# Patient Record
Sex: MALE | Race: WHITE | Employment: UNEMPLOYED | URBAN - METROPOLITAN AREA
[De-identification: names, ages, dates, MRNs, and addresses within clinical notes are randomized per-mention and may not be internally consistent; named-entity substitution may affect disease eponyms.]

---

## 2017-03-16 ENCOUNTER — HOSPITAL ENCOUNTER (OUTPATIENT)
Dept: GENERAL RADIOLOGY | Age: 37
Discharge: HOME | End: 2017-03-16
Payer: SELF-PAY

## 2017-03-16 DIAGNOSIS — M25.569 KNEE PAIN: ICD-10-CM

## 2017-03-16 PROCEDURE — 73562 X-RAY EXAM OF KNEE 3: CPT

## 2017-12-03 ENCOUNTER — HOSPITAL ENCOUNTER (OUTPATIENT)
Age: 37
Setting detail: OBSERVATION
Discharge: HOME | End: 2017-12-04
Attending: STUDENT IN AN ORGANIZED HEALTH CARE EDUCATION/TRAINING PROGRAM | Admitting: INTERNAL MEDICINE
Payer: COMMERCIAL

## 2017-12-03 ENCOUNTER — APPOINTMENT (OUTPATIENT)
Dept: CT IMAGING | Age: 37
End: 2017-12-03
Attending: PHYSICIAN ASSISTANT
Payer: COMMERCIAL

## 2017-12-03 DIAGNOSIS — R10.9 ABDOMINAL PAIN, UNSPECIFIED ABDOMINAL LOCATION: Primary | ICD-10-CM

## 2017-12-03 LAB
ALBUMIN SERPL-MCNC: 4.1 G/DL (ref 3.5–5)
ALBUMIN/GLOB SERPL: 1.1 {RATIO} (ref 1.1–2.2)
ALP SERPL-CCNC: 80 U/L (ref 45–117)
ALT SERPL-CCNC: 54 U/L (ref 12–78)
ANION GAP SERPL CALC-SCNC: 8 MMOL/L (ref 5–15)
APPEARANCE UR: CLEAR
AST SERPL-CCNC: 28 U/L (ref 15–37)
BACTERIA URNS QL MICRO: NEGATIVE /HPF
BASOPHILS # BLD: 0 K/UL (ref 0–0.1)
BASOPHILS NFR BLD: 0 % (ref 0–1)
BILIRUB SERPL-MCNC: 1 MG/DL (ref 0.2–1)
BILIRUB UR QL: NEGATIVE
BUN SERPL-MCNC: 12 MG/DL (ref 6–20)
BUN/CREAT SERPL: 11 (ref 12–20)
CALCIUM SERPL-MCNC: 9.6 MG/DL (ref 8.5–10.1)
CHLORIDE SERPL-SCNC: 105 MMOL/L (ref 97–108)
CO2 SERPL-SCNC: 27 MMOL/L (ref 21–32)
COLOR UR: ABNORMAL
CREAT SERPL-MCNC: 1.09 MG/DL (ref 0.7–1.3)
EOSINOPHIL # BLD: 0.4 K/UL (ref 0–0.4)
EOSINOPHIL NFR BLD: 4 % (ref 0–7)
EPITH CASTS URNS QL MICRO: ABNORMAL /LPF
ERYTHROCYTE [DISTWIDTH] IN BLOOD BY AUTOMATED COUNT: 13.1 % (ref 11.5–14.5)
GLOBULIN SER CALC-MCNC: 3.6 G/DL (ref 2–4)
GLUCOSE SERPL-MCNC: 100 MG/DL (ref 65–100)
GLUCOSE UR STRIP.AUTO-MCNC: NEGATIVE MG/DL
HCT VFR BLD AUTO: 46.5 % (ref 36.6–50.3)
HGB BLD-MCNC: 16.6 G/DL (ref 12.1–17)
HGB UR QL STRIP: NEGATIVE
HYALINE CASTS URNS QL MICRO: ABNORMAL /LPF (ref 0–5)
KETONES UR QL STRIP.AUTO: ABNORMAL MG/DL
LEUKOCYTE ESTERASE UR QL STRIP.AUTO: NEGATIVE
LIPASE SERPL-CCNC: 89 U/L (ref 73–393)
LYMPHOCYTES # BLD: 2.6 K/UL (ref 0.8–3.5)
LYMPHOCYTES NFR BLD: 29 % (ref 12–49)
MCH RBC QN AUTO: 32.2 PG (ref 26–34)
MCHC RBC AUTO-ENTMCNC: 35.7 G/DL (ref 30–36.5)
MCV RBC AUTO: 90.1 FL (ref 80–99)
MONOCYTES # BLD: 0.8 K/UL (ref 0–1)
MONOCYTES NFR BLD: 9 % (ref 5–13)
NEUTS SEG # BLD: 5.2 K/UL (ref 1.8–8)
NEUTS SEG NFR BLD: 58 % (ref 32–75)
NITRITE UR QL STRIP.AUTO: NEGATIVE
PH UR STRIP: 8.5 [PH] (ref 5–8)
PLATELET # BLD AUTO: 218 K/UL (ref 150–400)
POTASSIUM SERPL-SCNC: 4 MMOL/L (ref 3.5–5.1)
PROT SERPL-MCNC: 7.7 G/DL (ref 6.4–8.2)
PROT UR STRIP-MCNC: NEGATIVE MG/DL
RBC # BLD AUTO: 5.16 M/UL (ref 4.1–5.7)
RBC #/AREA URNS HPF: ABNORMAL /HPF (ref 0–5)
SODIUM SERPL-SCNC: 140 MMOL/L (ref 136–145)
SP GR UR REFRACTOMETRY: 1.03 (ref 1–1.03)
TROPONIN I SERPL-MCNC: <0.04 NG/ML
UR CULT HOLD, URHOLD: NORMAL
UROBILINOGEN UR QL STRIP.AUTO: 0.2 EU/DL (ref 0.2–1)
WBC # BLD AUTO: 9 K/UL (ref 4.1–11.1)
WBC URNS QL MICRO: ABNORMAL /HPF (ref 0–4)

## 2017-12-03 PROCEDURE — 74011636320 HC RX REV CODE- 636/320: Performed by: STUDENT IN AN ORGANIZED HEALTH CARE EDUCATION/TRAINING PROGRAM

## 2017-12-03 PROCEDURE — 74011250636 HC RX REV CODE- 250/636: Performed by: PHYSICIAN ASSISTANT

## 2017-12-03 PROCEDURE — 96374 THER/PROPH/DIAG INJ IV PUSH: CPT

## 2017-12-03 PROCEDURE — 81001 URINALYSIS AUTO W/SCOPE: CPT | Performed by: PHYSICIAN ASSISTANT

## 2017-12-03 PROCEDURE — 93005 ELECTROCARDIOGRAM TRACING: CPT

## 2017-12-03 PROCEDURE — 74011000258 HC RX REV CODE- 258: Performed by: STUDENT IN AN ORGANIZED HEALTH CARE EDUCATION/TRAINING PROGRAM

## 2017-12-03 PROCEDURE — 80053 COMPREHEN METABOLIC PANEL: CPT | Performed by: STUDENT IN AN ORGANIZED HEALTH CARE EDUCATION/TRAINING PROGRAM

## 2017-12-03 PROCEDURE — 96361 HYDRATE IV INFUSION ADD-ON: CPT

## 2017-12-03 PROCEDURE — 96375 TX/PRO/DX INJ NEW DRUG ADDON: CPT

## 2017-12-03 PROCEDURE — 74011250637 HC RX REV CODE- 250/637: Performed by: PHYSICIAN ASSISTANT

## 2017-12-03 PROCEDURE — 83690 ASSAY OF LIPASE: CPT | Performed by: STUDENT IN AN ORGANIZED HEALTH CARE EDUCATION/TRAINING PROGRAM

## 2017-12-03 PROCEDURE — 99218 HC RM OBSERVATION: CPT

## 2017-12-03 PROCEDURE — 74011000250 HC RX REV CODE- 250: Performed by: PHYSICIAN ASSISTANT

## 2017-12-03 PROCEDURE — 36415 COLL VENOUS BLD VENIPUNCTURE: CPT | Performed by: STUDENT IN AN ORGANIZED HEALTH CARE EDUCATION/TRAINING PROGRAM

## 2017-12-03 PROCEDURE — 99285 EMERGENCY DEPT VISIT HI MDM: CPT

## 2017-12-03 PROCEDURE — 84484 ASSAY OF TROPONIN QUANT: CPT | Performed by: STUDENT IN AN ORGANIZED HEALTH CARE EDUCATION/TRAINING PROGRAM

## 2017-12-03 PROCEDURE — 74177 CT ABD & PELVIS W/CONTRAST: CPT

## 2017-12-03 PROCEDURE — 85025 COMPLETE CBC W/AUTO DIFF WBC: CPT | Performed by: STUDENT IN AN ORGANIZED HEALTH CARE EDUCATION/TRAINING PROGRAM

## 2017-12-03 RX ORDER — ONDANSETRON 2 MG/ML
4 INJECTION INTRAMUSCULAR; INTRAVENOUS
Status: DISCONTINUED | OUTPATIENT
Start: 2017-12-03 | End: 2017-12-04 | Stop reason: HOSPADM

## 2017-12-03 RX ORDER — KETOROLAC TROMETHAMINE 30 MG/ML
30 INJECTION, SOLUTION INTRAMUSCULAR; INTRAVENOUS
Status: COMPLETED | OUTPATIENT
Start: 2017-12-03 | End: 2017-12-03

## 2017-12-03 RX ORDER — FENTANYL CITRATE 50 UG/ML
50 INJECTION, SOLUTION INTRAMUSCULAR; INTRAVENOUS
Status: COMPLETED | OUTPATIENT
Start: 2017-12-03 | End: 2017-12-03

## 2017-12-03 RX ORDER — ONDANSETRON 2 MG/ML
4 INJECTION INTRAMUSCULAR; INTRAVENOUS
Status: COMPLETED | OUTPATIENT
Start: 2017-12-03 | End: 2017-12-03

## 2017-12-03 RX ORDER — FAMOTIDINE 10 MG/ML
20 INJECTION INTRAVENOUS
Status: DISCONTINUED | OUTPATIENT
Start: 2017-12-03 | End: 2017-12-03 | Stop reason: CLARIF

## 2017-12-03 RX ORDER — MORPHINE SULFATE 10 MG/ML
8 INJECTION, SOLUTION INTRAMUSCULAR; INTRAVENOUS ONCE
Status: COMPLETED | OUTPATIENT
Start: 2017-12-03 | End: 2017-12-03

## 2017-12-03 RX ORDER — SUCRALFATE 1 G/10ML
1 SUSPENSION ORAL
Status: COMPLETED | OUTPATIENT
Start: 2017-12-03 | End: 2017-12-03

## 2017-12-03 RX ORDER — SODIUM CHLORIDE 0.9 % (FLUSH) 0.9 %
5-10 SYRINGE (ML) INJECTION EVERY 8 HOURS
Status: DISCONTINUED | OUTPATIENT
Start: 2017-12-03 | End: 2017-12-04 | Stop reason: HOSPADM

## 2017-12-03 RX ORDER — SODIUM CHLORIDE 0.9 % (FLUSH) 0.9 %
5-10 SYRINGE (ML) INJECTION AS NEEDED
Status: DISCONTINUED | OUTPATIENT
Start: 2017-12-03 | End: 2017-12-04 | Stop reason: HOSPADM

## 2017-12-03 RX ORDER — HYDROMORPHONE HYDROCHLORIDE 2 MG/ML
1 INJECTION, SOLUTION INTRAMUSCULAR; INTRAVENOUS; SUBCUTANEOUS ONCE
Status: DISCONTINUED | OUTPATIENT
Start: 2017-12-03 | End: 2017-12-03

## 2017-12-03 RX ORDER — SODIUM CHLORIDE 0.9 % (FLUSH) 0.9 %
10 SYRINGE (ML) INJECTION
Status: COMPLETED | OUTPATIENT
Start: 2017-12-03 | End: 2017-12-03

## 2017-12-03 RX ORDER — DICYCLOMINE HYDROCHLORIDE 20 MG/1
20 TABLET ORAL
Status: COMPLETED | OUTPATIENT
Start: 2017-12-03 | End: 2017-12-03

## 2017-12-03 RX ORDER — HYDROMORPHONE HYDROCHLORIDE 2 MG/ML
1 INJECTION, SOLUTION INTRAMUSCULAR; INTRAVENOUS; SUBCUTANEOUS ONCE
Status: COMPLETED | OUTPATIENT
Start: 2017-12-03 | End: 2017-12-03

## 2017-12-03 RX ADMIN — MORPHINE SULFATE 8 MG: 10 INJECTION, SOLUTION INTRAMUSCULAR; INTRAVENOUS at 21:19

## 2017-12-03 RX ADMIN — HYDROMORPHONE HYDROCHLORIDE 1 MG: 2 INJECTION INTRAMUSCULAR; INTRAVENOUS; SUBCUTANEOUS at 19:57

## 2017-12-03 RX ADMIN — SUCRALFATE 1 G: 1 SUSPENSION ORAL at 22:34

## 2017-12-03 RX ADMIN — SODIUM CHLORIDE 1000 ML: 900 INJECTION, SOLUTION INTRAVENOUS at 19:57

## 2017-12-03 RX ADMIN — DICYCLOMINE HYDROCHLORIDE 20 MG: 20 TABLET ORAL at 20:02

## 2017-12-03 RX ADMIN — KETOROLAC TROMETHAMINE 30 MG: 30 INJECTION, SOLUTION INTRAMUSCULAR at 21:19

## 2017-12-03 RX ADMIN — IOPAMIDOL 100 ML: 755 INJECTION, SOLUTION INTRAVENOUS at 20:45

## 2017-12-03 RX ADMIN — FAMOTIDINE 20 MG: 10 INJECTION, SOLUTION INTRAVENOUS at 22:36

## 2017-12-03 RX ADMIN — ONDANSETRON 4 MG: 2 INJECTION INTRAMUSCULAR; INTRAVENOUS at 19:57

## 2017-12-03 RX ADMIN — Medication 10 ML: at 20:45

## 2017-12-03 RX ADMIN — SODIUM CHLORIDE 100 ML: 900 INJECTION, SOLUTION INTRAVENOUS at 20:45

## 2017-12-03 RX ADMIN — FENTANYL CITRATE 50 MCG: 50 INJECTION, SOLUTION INTRAMUSCULAR; INTRAVENOUS at 20:28

## 2017-12-03 NOTE — ED TRIAGE NOTES
Pt states that he has severe pain to his lower abd which started 20 minutes ago with no N/V/D. Pt states that he has had this severe pain on three other occasions.

## 2017-12-03 NOTE — IP AVS SNAPSHOT
[address] 
[phone] Patient: Carlos Rutherford MRN: [id number] QTN:9/08/0870 You are allergic to the following Allergen Reactions Chicken Derived Unknown (comments) Egg Nausea Only Pineapple Unknown (comments) Recent Documentation Height Weight BMI Smoking Status 1.854 m 121.6 kg 35.36 kg/m2 Never Smoker Emergency Contacts  (Rel.) Home Phone Work Phone Mobile Phone None,None -- -- -- About your hospitalization You were admitted on:  December 3, 2017 You last received care in the:  [facility] You were discharged on:  December 4, 2017 Why you were hospitalized Your primary diagnosis was:  Not on File Your diagnoses also included:  Abdominal Pain Providers Seen During Your Hospitalization Provider Specialty Primary office phone Onesimo Miller MD Emergency Medicine [phone] Juan A Wetzel MD Internal Medicine [phone] Aguila Horton MD Internal Medicine [phone] Your Primary Care Physician (PCP) Primary Care Physician Office Phone Office Fax [facility] [phone] Follow-up Information Follow up With Details Comments Contact Info Piyush العلي MD In 1 week  [facility] [address] [place] 
[phone] Hollis Edmonds MD In 2 weeks  [address] [facility] [address] [place] 
[phone] My Medications TAKE these medications as instructed Instructions Each Dose to Equal  
 Morning Noon Evening Bedtime * amphetamine-dextroamphetamine XR 25 mg XR capsule Commonly known as:  ADDERALL XR Your last dose was: Your next dose is: Take 1 Cap by mouth every morning.  Max Daily Amount: 25 mg.  
 25 mg  
    
   
   
   
  
 * amphetamine-dextroamphetamine XR 20 mg XR capsule Commonly known as:  ADDERALL XR Your last dose was: Your next dose is: Take 1 Cap by mouth every morning. Max Daily Amount: 20 mg.  
 20 mg  
    
   
   
   
  
 ergocalciferol 50,000 unit capsule Commonly known as:  ERGOCALCIFEROL Your last dose was: Your next dose is:    
   
   
      
   
   
   
  
 HYDROcodone-acetaminophen 5-325 mg per tablet Commonly known as:  Chantal Fothergill Your last dose was: Your next dose is: Take 1 Tab by mouth every six (6) hours as needed. Max Daily Amount: 4 Tabs. 1 Tab  
    
   
   
   
  
 pantoprazole 40 mg tablet Commonly known as:  PROTONIX Start taking on:  12/5/2017 Your last dose was: Your next dose is: Take 1 Tab by mouth Daily (before breakfast). 40 mg  
    
   
   
   
  
 vortioxetine 10 mg tablet Commonly known as:  [employer] Your last dose was: Your next dose is: Take 1 Tab by mouth daily. 10 mg  
    
   
   
   
  
 * Notice: This list has 2 medication(s) that are the same as other medications prescribed for you. Read the directions carefully, and ask your doctor or other care provider to review them with you. Where to Get Your Medications Information on where to get these meds will be given to you by the nurse or doctor. ! Ask your nurse or doctor about these medications HYDROcodone-acetaminophen 5-325 mg per tablet  
 pantoprazole 40 mg tablet Discharge Instructions Discharge Instructions PATIENT ID: Librado Mata MRN: [id number] YOB: 1980 DATE OF ADMISSION: 12/3/2017  7:18 PM   
DATE OF DISCHARGE: 12/4/2017 PRIMARY CARE PROVIDER: Lina Greenwood MD  
 
ATTENDING PHYSICIAN: Johnathan Olivo MD 
DISCHARGING PROVIDER: Johnathan Olivo MD   
 To contact this individual call [phone] and ask the  to page. If unavailable ask to be transferred the Adult Hospitalist Department. DISCHARGE DIAGNOSES Abdominal pain CONSULTATIONS: IP CONSULT TO GENERAL SURGERY 
IP CONSULT TO HOSPITALIST 
IP CONSULT TO GASTROENTEROLOGY PROCEDURES/SURGERIES: Procedure(s): ESOPHAGOGASTRODUODENOSCOPY (EGD) PENDING TEST RESULTS:  
At the time of discharge the following test results are still pending: none FOLLOW UP APPOINTMENTS:  
Follow-up Information Follow up With Details Comments Contact Linette العلي MD In 1 week  [place] [address] 
[phone] ADDITIONAL CARE RECOMMENDATIONS:  
Follow up with Gi in 1 week DIET: Cardiac Diet ACTIVITY: Activity as tolerated DISCHARGE MEDICATIONS: 
 See Medication Reconciliation Form · It is important that you take the medication exactly as they are prescribed. · Keep your medication in the bottles provided by the pharmacist and keep a list of the medication names, dosages, and times to be taken in your wallet. · Do not take other medications without consulting your doctor. NOTIFY YOUR PHYSICIAN FOR ANY OF THE FOLLOWING:  
Fever over 101 degrees for 24 hours. Chest pain, shortness of breath, fever, chills, nausea, vomiting, diarrhea, change in mentation, falling, weakness, bleeding. Severe pain or pain not relieved by medications. Or, any other signs or symptoms that you may have questions about. DISPOSITION: 
x  Home With: 
 OT  PT  [facility]  RN  
  
 SNF/Inpatient Rehab/LTAC Independent/assisted living Hospice Other: CDMP Checked:  
Yes x PROBLEM LIST Updated: 
Yes x Signed:  
Aguila Horton MD 
12/4/2017 
5:08 PM 
 
[employer] Activation Thank you for requesting access to [employer]. Please follow the instructions below to securely access and download your online medical record.  [employer] allows you to send messages to your doctor, view your test results, renew your prescriptions, schedule appointments, and more. How Do I Sign Up? 1. In your internet browser, go to [email or web address] 
2. Click on the First Time User? Click Here link in the Sign In box. You will be redirect to the New Member Sign Up page. 3. Enter your Theracos Access Code exactly as it appears below. You will not need to use this code after youve completed the sign-up process. If you do not sign up before the expiration date, you must request a new code. Ther[employer]os Access Code: [email or web address] Expires: 3/4/2018 10:12 AM (This is the date your Ther[employer]os access code will ) 4. Enter the last four digits of your Social Security Number (xxxx) and Date of Birth (mm/dd/yyyy) as indicated and click Submit. You will be taken to the next sign-up page. 5. Create a Theracos ID. This will be your Theracos login ID and cannot be changed, so think of one that is secure and easy to remember. 6. Create a Theracos password. You can change your password at any time. 7. Enter your Password Reset Question and Answer. This can be used at a later time if you forget your password. 8. Enter your e-mail address. You will receive e-mail notification when new information is available in [address] 9. Click Sign Up. You can now view and download portions of your medical record. 10. Click the Download Summary menu link to download a portable copy of your medical information. Additional Information If you have questions, please visit the Frequently Asked Questions section of the [employer] website at [email or web address] Remember, MyChart is NOT to be used for urgent needs. For medical emergencies, dial 911. Pantoprazole (Protonix) - (By mouth) Why this medicine is used:  
Treats gastroesophageal reflux disease (GERD), a damaged esophagus, and high levels of stomach acid. Contact a nurse or doctor right away if you have: · Blistering, peeling, red skin rash · Swelling, muscle pain, stiffness, cramps, or twitching · Joint pain, rash on your cheeks or arms that gets worse in the sun · Dark-colored urine, change in how much or how often you urinate · Seizures, dizziness, uneven heartbeat · Severe diarrhea, stomach cramps, fever, weight gain Common side effects: · Mild diarrhea, stomach pain · Headache, tiredness © 2017 [facility] Information is for End User's use only and may not be sold, redistributed or otherwise used for commercial purposes. Hydrocodone/Acetaminophen (Vicodin, Norco, Lortab) - (By mouth) Why this medicine is used:  
Treats pain. Contact a nurse or doctor right away if you have: · Blistering, peeling, red skin rash · Fast or slow heartbeat, shallow breathing, blue lips, fingernails, or skin · Anxiety, restlessness, muscle spasms, twitching, seeing or hearing things that are not there · Dark urine or pale stools, yellow skin or eyes · Extreme weakness, sweating, seizures, cold or clammy skin · Lightheadedness, dizziness, fainting, fever, sweating Common side effects: 
· Constipation, nausea, vomiting, loss of appetite, stomach pain · Tiredness or sleepiness © 2017 [facility] Information is for End User's use only and may not be sold, redistributed or otherwise used for commercial purposes. Discharge Orders None [place] Announcement We are excited to announce that we are making your provider's discharge notes available to you in Zipwhip. You will see these notes when they are completed and signed by the physician that discharged you from your recent hospital stay.   If you have any questions or concerns about any information you see in Zipwhip, please call the Health Information Department where you were seen or reach out to your Primary Care Provider for more information about your plan of care. Introducing [facility]! Jerel Jean introduces [employer]use patient portal. Now you can access parts of your medical record, email your doctor's office, and request medication refills online. 1. In your internet browser, go to [email or web address] 2. Click on the First Time User? Click Here link in the Sign In box. You will see the New Member Sign Up page. 3. Enter your [employer]Muse Access Code exactly as it appears below. You will not need to use this code after youve completed the sign-up process. If you do not sign up before the expiration date, you must request a new code. · [employer] Access Code: [email or web address] Expires: 3/4/2018 10:12 AM 
 
4. Enter the last four digits of your Social Security Number (xxxx) and Date of Birth (mm/dd/yyyy) as indicated and click Submit. You will be taken to the next sign-up page. 5. Create a MarketMuse ID. This will be your MarketMuse login ID and cannot be changed, so think of one that is secure and easy to remember. 6. Create a [employer]Muse password. You can change your password at any time. 7. Enter your Password Reset Question and Answer. This can be used at a later time if you forget your password. 8. Enter your e-mail address. You will receive e-mail notification when new information is available in [address] 9. Click Sign Up. You can now view and download portions of your medical record. 10. Click the Download Summary menu link to download a portable copy of your medical information. If you have questions, please visit the Frequently Asked Questions section of the [employer]Muse website. Remember, [employer]use is NOT to be used for urgent needs. For medical emergencies, dial 911. Now available from your iPhone and Android! General Information Please provide this summary of care documentation to your next provider.  
  
  
    
    
 Patient Signature: ____________________________________________________________ Date:  ____________________________________________________________  
  
[employer] Provider Signature:  ____________________________________________________________ Date:  ____________________________________________________________

## 2017-12-04 ENCOUNTER — APPOINTMENT (OUTPATIENT)
Dept: NUCLEAR MEDICINE | Age: 37
End: 2017-12-04
Attending: SURGERY
Payer: COMMERCIAL

## 2017-12-04 VITALS
WEIGHT: 268 LBS | HEART RATE: 74 BPM | RESPIRATION RATE: 16 BRPM | SYSTOLIC BLOOD PRESSURE: 121 MMHG | TEMPERATURE: 98 F | DIASTOLIC BLOOD PRESSURE: 82 MMHG | HEIGHT: 73 IN | BODY MASS INDEX: 35.52 KG/M2 | OXYGEN SATURATION: 96 %

## 2017-12-04 LAB
ATRIAL RATE: 72 BPM
CALCULATED P AXIS, ECG09: 48 DEGREES
CALCULATED R AXIS, ECG10: 22 DEGREES
CALCULATED T AXIS, ECG11: 39 DEGREES
DIAGNOSIS, 93000: NORMAL
P-R INTERVAL, ECG05: 168 MS
Q-T INTERVAL, ECG07: 382 MS
QRS DURATION, ECG06: 100 MS
QTC CALCULATION (BEZET), ECG08: 418 MS
VENTRICULAR RATE, ECG03: 72 BPM

## 2017-12-04 PROCEDURE — 99218 HC RM OBSERVATION: CPT

## 2017-12-04 PROCEDURE — A9537 TC99M MEBROFENIN: HCPCS

## 2017-12-04 PROCEDURE — 74011250636 HC RX REV CODE- 250/636: Performed by: INTERNAL MEDICINE

## 2017-12-04 PROCEDURE — 74011250637 HC RX REV CODE- 250/637: Performed by: HOSPITALIST

## 2017-12-04 PROCEDURE — 96376 TX/PRO/DX INJ SAME DRUG ADON: CPT

## 2017-12-04 PROCEDURE — 74011250637 HC RX REV CODE- 250/637: Performed by: INTERNAL MEDICINE

## 2017-12-04 PROCEDURE — 96372 THER/PROPH/DIAG INJ SC/IM: CPT

## 2017-12-04 RX ORDER — MORPHINE SULFATE 2 MG/ML
2 INJECTION, SOLUTION INTRAMUSCULAR; INTRAVENOUS
Status: DISCONTINUED | OUTPATIENT
Start: 2017-12-04 | End: 2017-12-04 | Stop reason: HOSPADM

## 2017-12-04 RX ORDER — PANTOPRAZOLE SODIUM 40 MG/1
40 TABLET, DELAYED RELEASE ORAL
Qty: 30 TAB | Refills: 0 | Status: SHIPPED | OUTPATIENT
Start: 2017-12-05

## 2017-12-04 RX ORDER — MORPHINE SULFATE 2 MG/ML
2 INJECTION, SOLUTION INTRAMUSCULAR; INTRAVENOUS
Status: COMPLETED | OUTPATIENT
Start: 2017-12-04 | End: 2017-12-04

## 2017-12-04 RX ORDER — DEXTROAMPHETAMINE SACCHARATE, AMPHETAMINE ASPARTATE MONOHYDRATE, DEXTROAMPHETAMINE SULFATE AND AMPHETAMINE SULFATE 1.25; 1.25; 1.25; 1.25 MG/1; MG/1; MG/1; MG/1
20 CAPSULE, EXTENDED RELEASE ORAL
Status: DISCONTINUED | OUTPATIENT
Start: 2017-12-04 | End: 2017-12-04 | Stop reason: ALTCHOICE

## 2017-12-04 RX ORDER — HYDROCODONE BITARTRATE AND ACETAMINOPHEN 5; 325 MG/1; MG/1
1 TABLET ORAL
Qty: 10 TAB | Refills: 0 | Status: SHIPPED | OUTPATIENT
Start: 2017-12-04

## 2017-12-04 RX ORDER — HYDROCODONE BITARTRATE AND ACETAMINOPHEN 5; 325 MG/1; MG/1
1 TABLET ORAL
Status: DISCONTINUED | OUTPATIENT
Start: 2017-12-04 | End: 2017-12-04 | Stop reason: HOSPADM

## 2017-12-04 RX ORDER — PANTOPRAZOLE SODIUM 40 MG/1
40 TABLET, DELAYED RELEASE ORAL
Status: DISCONTINUED | OUTPATIENT
Start: 2017-12-04 | End: 2017-12-04 | Stop reason: HOSPADM

## 2017-12-04 RX ORDER — HEPARIN SODIUM 5000 [USP'U]/ML
5000 INJECTION, SOLUTION INTRAVENOUS; SUBCUTANEOUS EVERY 8 HOURS
Status: DISCONTINUED | OUTPATIENT
Start: 2017-12-04 | End: 2017-12-04

## 2017-12-04 RX ORDER — DEXTROAMPHETAMINE SACCHARATE, AMPHETAMINE ASPARTATE MONOHYDRATE, DEXTROAMPHETAMINE SULFATE AND AMPHETAMINE SULFATE 1.25; 1.25; 1.25; 1.25 MG/1; MG/1; MG/1; MG/1
25 CAPSULE, EXTENDED RELEASE ORAL
Status: DISCONTINUED | OUTPATIENT
Start: 2017-12-04 | End: 2017-12-04 | Stop reason: ALTCHOICE

## 2017-12-04 RX ORDER — ACETAMINOPHEN 325 MG/1
650 TABLET ORAL
Status: DISCONTINUED | OUTPATIENT
Start: 2017-12-04 | End: 2017-12-04 | Stop reason: HOSPADM

## 2017-12-04 RX ADMIN — PANTOPRAZOLE SODIUM 40 MG: 40 TABLET, DELAYED RELEASE ORAL at 10:28

## 2017-12-04 RX ADMIN — HYDROCODONE BITARTRATE AND ACETAMINOPHEN 1 TABLET: 5; 325 TABLET ORAL at 00:49

## 2017-12-04 RX ADMIN — Medication 10 ML: at 05:41

## 2017-12-04 RX ADMIN — HEPARIN SODIUM 5000 UNITS: 5000 INJECTION, SOLUTION INTRAVENOUS; SUBCUTANEOUS at 00:50

## 2017-12-04 RX ADMIN — MORPHINE SULFATE 2 MG: 2 INJECTION, SOLUTION INTRAMUSCULAR; INTRAVENOUS at 15:36

## 2017-12-04 RX ADMIN — ACETAMINOPHEN 650 MG: 325 TABLET, FILM COATED ORAL at 12:19

## 2017-12-04 RX ADMIN — Medication 10 ML: at 00:53

## 2017-12-04 NOTE — PROGRESS NOTES
TRANSFER - IN REPORT:    Verbal report received from [facility] RN(name) on Masha Mojica  being received from ED(unit) for {TRANSFER CARE:11566}      Report consisted of patients Situation, Background, Assessment and   Recommendations(SBAR). Information from the following report(s) {SBAR REPORTS HRWW:16732} was reviewed with the receiving nurse. Opportunity for questions and clarification was provided. Assessment completed upon patients arrival to unit and care assumed.

## 2017-12-04 NOTE — PROGRESS NOTES
Spoke with tami carlton and informed patient has returned from scan and she will see him this afternoon. Informed Quynh Krishnamurthy primary nurse.

## 2017-12-04 NOTE — H&P
[address] [facility] [place] [address]   HISTORY AND PHYSICAL       Name:  Mayte Palomo   MR#:  [id number]   :  1980   Account #:  [de-identified]        Date of Adm:  2017       HISTORY OF PRESENT ILLNESS: This 80-year-old white male   presents to the emergency department with epigastric/periumbilical   abdominal pain that occurred this afternoon. He states the pain feels   as if somebody is twisting his stomach. He denies any nausea,   vomiting, diarrhea, constipation, fever or chills. He does report   dysphagia, in which he states solid foods, such as rice, potatoes,   meats and bread get stuck in his throat/chest, and has occurred for   approximately 15 years, in which he has been told he has had a dilated   esophagus. He denies any liquids causing discomfort. He denies any change in weight, chest pain, shortness of breath,   nausea, vomiting, change in frequency, urgency of urination,   hematemesis, melena, hematochezia, tingling, or numbness. PAST MEDICAL HISTORY:   1. Depression. 2. Migraines. PAST SURGICAL HISTORY: Right knee surgery. MEDICATIONS:   1. Adderall 45 mg p.o. every morning. 2. Ergocalciferol 50,000 international units. 3. Brintellix 10 mg p.o. every day. ALLERGIES: NO KNOWN DRUG ALLERGIES. FAMILY HISTORY: The patient reports his grandmother had   dysphagia similar to what he has had. SOCIAL HISTORY: The patient lives alone. 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 He denies any alcohol, tobacco or illicit drug   usage. REVIEW OF SYSTEMS: Sixteen systems reviewed. Significant   as stated in HPI. PHYSICAL EXAMINATION   GENERAL: White male lying in bed in no acute cardiopulmonary   distress. VITAL SIGNS: Temperature 97.8 degrees Fahrenheit, pulse 90,   respirations 18, blood pressure 113/88, O2 saturations 97% on 4 liters   nasal cannula. HEAD: Head normocephalic, atraumatic.  No maxillary, frontal, or temporal tenderness. EYES: Pupils equal, round, reactive to light. Anicteric sclerae. Conjunctivae pink, moist.   NOSE: No deviated or perforated septum. No nasal discharge. MOUTH/THROAT: Oral mucosa pink, moist. Non erythematous throat . No ulcers, no lesions. CARDIOVASCULAR: Regular rate and rhythm, S1, S2 audible. No   murmurs, gallops, rubs. No edema, no JVD. No carotid abdominal   bruits. Radial and dorsalis pedis pulses 2+ bilaterally. LUNGS: Clear to auscultation bilaterally. No wheezes, rales or rhonchi. GASTROINTESTINAL: Bowel sounds present. Abdomen soft,   nontender, nondistended. No guarding, rebound or   hepatosplenomegaly. MUSCULOSKELETAL: Gross range of motion of the upper and lower   extremities bilaterally. No gross muscle atrophy is noted. NEUROLOGIC: Alert and oriented to person, place and time. Cranial   nerves 2-12 grossly intact. Biceps and patellar deep tendon reflexes 2+   bilaterally. Muscle strength 5/5 in all extremities. SKIN: No rashes or pallor. Multiple tattoos. ENDOCRINE: No thyromegaly. LYMPHATICS: No cervical, axillary, or inguinal lymphadenopathy. LABORATORY DATA:   CBC with differential: WBC 9, hemoglobin 16.6,   hematocrit 46.5, platelets 706, neutrophils 58%, lymphocytes 29%,   monocytes 9%, eosinophils 1%, basophils 0%. Urinalysis: Color   is yellow/straw, appearance is clear, specific gravity 1.026, pH 8.5,   protein negative, glucose negative, ketones trace, blood negative,   bilirubin negative, urobilinogen 0.2, nitrites negative, leukocyte   esterase negative, epithelial cells few, WBC's per high-power field 0-4,   RBC's per high-power field 0-5, bacteria negative, and hyaline casts 0-  2. CMP: Sodium 140, potassium 4, chloride 105, bicarbonate 27, anion   gap 8, BUN 12, creatinine 1.09, glucose 100. Calcium 9.6. Total   bilirubin 1, total protein 7.7, albumin 4.1, ALT 54, AST 28, alkaline   phosphatase 80, lipase 89.  Troponin less than 0.04.    ELECTROCARDIOGRAM: EKG with normal sinus rhythm. ABDOMINAL AND PELVIC CT SCAN: CT of the abdomen and pelvis   with contrast, per Radiology, shows cholelithiasis and left   nephrolithiasis with no acute findings. ASSESSMENT AND PLAN: This 59-year-old white male presents with   iatrogenic acute respiratory failure with hypoxia and   epigastric/periumbilical abdominal pain. PROBLEM LIST:   1. Iatrogenic acute respiratory failure with hypoxia. 2. Epigastric/periumbilical abdominal pain. 3. Cholelithiasis. 4. Nephrolithiasis. 5. Depression. 6. Migraine headaches. PLAN: The patient will be placed on observation. He was seen and   evaluated by Surgery and surgeon does not feel the pain to be biliary   in nature with no acute indication for cholecystectomy. Their   recommendation is to do a trial of PPI and Carafate for possible peptic   ulcer disease and if pain persists, recommend evaluation by GI for a   possible EGD or to consider a HIDA scan if pain persists. At time of encounter by this physician, the patient did not have any   pain or tenderness. Will place on a clear liquid diet and advance as   tolerated. A GI consult has been placed and will await their   recommendations. As relates to his acute hypoxic respiratory failure, secondary to   receiving pain/opioid medications, in which his O2 saturations dropped   to 85%. We will continue O2 nasal cannula and will titrate to room air. We will continue his Adderall and Brintellix for his depression. He does   not have history of ADHD; however, because of his Adderall usage,   most likely this is what he has. Will place SCD hose for DVT prophylaxis. We will control pain with acetaminophen, Norco and morphine   respectively. CODE STATUS: FULL CODE. TOTAL TIME FOR ADMISSION: 75 minutes.         Maximo Doe MD OB / Robert Zepeda   D:  12/03/2017   23:47   T:  12/04/2017   03:47   Job #:  [id number]

## 2017-12-04 NOTE — PROGRESS NOTES
Mr. Kadi Chung is feeling better today. Tm 98.4 HR: 74 BP: 121/82 Resp Rate: 16 per minute 96% sat on room air. No intake or output data in the 24 hours ending 12/04/17 1332   Exam: Cor: RRR. Lungs: Bilateral breath sounds. Clear to auscultation. Abd: Soft. Non distended. Tender in RUQ. No associated rebound or guarding. Labs: No results found for this or any previous visit (from the past 12 hour(s)). GI input - noted. Mr. Kadi Chung does not wish to proceed with EGD during this admission. HIDA scan is pending. Continue Protonix for now. Plans per Dr. Chela Velasquez.

## 2017-12-04 NOTE — PROGRESS NOTES
Hospitalist Progress Note  Mayur Perez MD  Answering service: [phone] OR [phone] from in house phone  Cell: [phone]      Date of Service:  2017  NAME:  Earney Bosworth  :  1980  MRN:  [id number]      Admission Summary:   45-year-old white male   presents to the emergency department with epigastric/periumbilical   abdominal pain that occurred this afternoon. He states the pain feels   as if somebody is twisting his stomach. He denies any nausea,   vomiting, diarrhea, constipation, fever or chills. He does report   dysphagia, in which he states solid foods, such as rice, potatoes,   meats and bread get stuck in his throat/chest, and has occurred for   approximately 15 years, in which he has been told he has had a dilated   esophagus. He denies any liquids causing discomfort    Interval history / Subjective:     F/u abd pain   The patient now feels better and wants to get discharged  Assessment & Plan:     Abd pain  -sec to ? PUD  -On PPI/Carafate  -HIDA scan and consult from GI ordered, follow  -Plan for EGD but the patient does not want to do it. Will discharge the patient with outpatient follow up. Spoke to GI, cleared    SOB/hypoxia  -sec to pain meds  -now resolved    Clears    Code status: FULL CODE  DVT prophylaxis: scd    Plan: Discharge when cleared by GI    Care Plan discussed with: Patient/Family  Disposition: TBD     Hospital Problems  Date Reviewed: 7/15/2015          Codes Class Noted POA    Abdominal pain ICD-10-CM: R10.9  ICD-9-CM: 789.00  12/3/2017 Unknown                Review of Systems:   A comprehensive review of systems was negative except for that written in the HPI. Vital Signs:    Last 24hrs VS reviewed since prior progress note.  Most recent are:  Visit Vitals    /89 (BP 1 Location: Left arm, BP Patient Position: At rest)    Pulse 86    Temp 97.6 °F (36.4 °C)    Resp 18    Ht 6' 1\" (1.854 m)    Wt 121.6 kg (268 lb)    SpO2 100%    BMI 35.36 kg/m2       No intake or output data in the 24 hours ending 12/04/17 0912     Physical Examination:             Constitutional:  No acute distress, cooperative, pleasant    ENT:  Oral mucous moist, oropharynx benign. Neck supple,    Resp:  CTA bilaterally. No wheezing/rhonchi/rales. No accessory muscle use   CV:  Regular rhythm, normal rate, no murmurs, gallops, rubs    GI:  Soft, non distended, non tender. normoactive bowel sounds, no hepatosplenomegaly     Musculoskeletal:  No edema, warm, 2+ pulses throughout    Neurologic:  Moves all extremities. AAOx3, CN II-XII reviewed     Skin:  Good turgor, no rashes or ulcers       Data Review:    Review and/or order of clinical lab test      Labs:     Recent Labs      12/03/17 1903   WBC  9.0   HGB  16.6   HCT  46.5   PLT  218     Recent Labs      12/03/17 1903   NA  140   K  4.0   CL  105   CO2  27   BUN  12   CREA  1.09   GLU  100   CA  9.6     Recent Labs      12/03/17 1903   SGOT  28   ALT  54   AP  80   TBILI  1.0   TP  7.7   ALB  4.1   GLOB  3.6   LPSE  89     No results for input(s): INR, PTP, APTT in the last 72 hours. No lab exists for component: INREXT   No results for input(s): FE, TIBC, PSAT, FERR in the last 72 hours. No results found for: FOL, RBCF   No results for input(s): PH, PCO2, PO2 in the last 72 hours.   Recent Labs      12/03/17 1903   TROIQ  <0.04     No results found for: CHOL, CHOLX, CHLST, CHOLV, HDL, LDL, LDLC, DLDLP, TGLX, TRIGL, TRIGP, CHHD, CHHDX  No results found for: [facility]  Lab Results   Component Value Date/Time    Color YELLOW/STRAW 12/03/2017 09:22 PM    Appearance CLEAR 12/03/2017 09:22 PM    Specific gravity 1.026 12/03/2017 09:22 PM    pH (UA) 8.5 12/03/2017 09:22 PM    Protein NEGATIVE  12/03/2017 09:22 PM    Glucose NEGATIVE  12/03/2017 09:22 PM    Ketone TRACE 12/03/2017 09:22 PM    Bilirubin NEGATIVE  12/03/2017 09:22 PM    Urobilinogen 0.2 12/03/2017 09:22 PM Nitrites NEGATIVE  12/03/2017 09:22 PM    Leukocyte Esterase NEGATIVE  12/03/2017 09:22 PM    Epithelial cells FEW 12/03/2017 09:22 PM    Bacteria NEGATIVE  12/03/2017 09:22 PM    WBC 0-4 12/03/2017 09:22 PM    RBC 0-5 12/03/2017 09:22 PM         Medications Reviewed:     Current Facility-Administered Medications   Medication Dose Route Frequency    acetaminophen (TYLENOL) tablet 650 mg  650 mg Oral Q6H PRN    HYDROcodone-acetaminophen (NORCO) 5-325 mg per tablet 1 Tab  1 Tab Oral Q6H PRN    morphine injection 2 mg  2 mg IntraVENous Q4H PRN    heparin (porcine) injection 5,000 Units  5,000 Units SubCUTAneous Q8H    sodium chloride (NS) flush 5-10 mL  5-10 mL IntraVENous Q8H    sodium chloride (NS) flush 5-10 mL  5-10 mL IntraVENous PRN    ondansetron (ZOFRAN) injection 4 mg  4 mg IntraVENous Q4H PRN     ______________________________________________________________________  EXPECTED LENGTH OF STAY: - - -  ACTUAL LENGTH OF STAY:          Concetta Inman MD

## 2017-12-04 NOTE — DISCHARGE SUMMARY
Discharge Summary       PATIENT ID: Tristan Rodriguez  MRN: [id number]   YOB: 1980    DATE OF ADMISSION: 12/3/2017  7:18 PM    DATE OF DISCHARGE: 12/4/2017   PRIMARY CARE PROVIDER: Solitario Yee MD     ATTENDING PHYSICIAN: Dr John Tirado  DISCHARGING PROVIDER: John Tirado MD    To contact this individual call [phone] and ask the  to page. If unavailable ask to be transferred the Adult Hospitalist Department. CONSULTATIONS: IP CONSULT TO GENERAL SURGERY  IP CONSULT TO HOSPITALIST  IP CONSULT TO GASTROENTEROLOGY    PROCEDURES/SURGERIES: Procedure(s):  ESOPHAGOGASTRODUODENOSCOPY (EGD)    ADMITTING DIAGNOSES & HOSPITAL COURSE:   Abd pain  -sec to ? PUD  -On PPI/Carafate  -HIDA scan shows chronic cholecystitis  -Plan for EGD but the patient does not want to do it. The patient is very angry because \"I have not eaten anything since this morning\". Cleared from GI perspective if cleared by Surgery  SPoke to Dr Sarahi Henderson, on call for Dr Sharda Betancourt, recommended that if the patient is not having severe pain he can leave with outpatient follow up    SOB/hypoxia  -sec to pain meds  -now resolved    Clears    Code status: FULL CODE  DVT prophylaxis: scd        DISCHARGE DIAGNOSES / PLAN:      1.  abd pain       PENDING TEST RESULTS:   At the time of discharge the following test results are still pending: none    FOLLOW UP APPOINTMENTS:    Follow-up Information     Follow up With Details Comments [address] [place] In 1 week  [address]  [phone]             ADDITIONAL CARE RECOMMENDATIONS:   Follow up with GI in 1 week    DIET: Cardiac Diet    ACTIVITY: Activity as tolerated      DISCHARGE MEDICATIONS:  Current Discharge Medication List      START taking these medications    Details   pantoprazole (PROTONIX) 40 mg tablet Take 1 Tab by mouth Daily (before breakfast).   Qty: 30 Tab, Refills: 0         CONTINUE these medications which have NOT CHANGED    Details   amphetamine-dextroamphetamine XR (ADDERALL XR) 20 mg XR capsule Take 1 Cap by mouth every morning. Max Daily Amount: 20 mg.  Qty: 30 Cap, Refills: 0    Associated Diagnoses: Anxiety state      amphetamine-dextroamphetamine XR (ADDERALL XR) 25 mg XR capsule Take 1 Cap by mouth every morning. Max Daily Amount: 25 mg.  Qty: 30 Cap, Refills: 0    Associated Diagnoses: Anxiety state      vortioxetine (BRINTELLIX) 10 mg tablet Take 1 Tab by mouth daily. Qty: 30 Tab, Refills: 0    Associated Diagnoses: Anxiety state      ergocalciferol (ERGOCALCIFEROL) 50,000 unit capsule                NOTIFY YOUR PHYSICIAN FOR ANY OF THE FOLLOWING:   Fever over 101 degrees for 24 hours. Chest pain, shortness of breath, fever, chills, nausea, vomiting, diarrhea, change in mentation, falling, weakness, bleeding. Severe pain or pain not relieved by medications. Or, any other signs or symptoms that you may have questions about.     DISPOSITION:  x  Home With:   OT  PT  HH  RN       Long term SNF/Inpatient Rehab    Independent/assisted living    Hospice    Other:       PATIENT CONDITION AT DISCHARGE:     Functional status    Poor     Deconditioned    x Independent      Cognition   x  Lucid     Forgetful     Dementia      Catheters/lines (plus indication)    Espinoza     PICC     PEG    x None      Code status   x  Full code     DNR      PHYSICAL EXAMINATION AT DISCHARGE:  Please see progress note      CHRONIC MEDICAL DIAGNOSES:  Problem List as of 12/4/2017  Date Reviewed: 7/15/2015          Codes Class Noted - Resolved    Abdominal pain ICD-10-CM: R10.9  ICD-9-CM: 789.00  12/3/2017 - Present        Anxiety state, unspecified ICD-10-CM: F41.1  ICD-9-CM: 300.00  2/9/2015 - Present        Posttraumatic stress disorder ICD-10-CM: F43.10  ICD-9-CM: 309.81  2/9/2015 - Present        Attention deficit disorder without mention of hyperactivity ICD-10-CM: F98.8  ICD-9-CM: 314.00  2/9/2015 - Present        Borderline personality disorder ICD-10-CM: F60.3  ICD-9-CM: 301.83  2/9/2015 - Present        Anxiety disorder ICD-10-CM: F41.9  ICD-9-CM: 300.00  1/7/2015 - Present        Major depressive disorder, recurrent (HCC) (Chronic) ICD-10-CM: F33.9  ICD-9-CM: 296.30  1/7/2015 - Present        RESOLVED: Bipolar II disorder ([facility] ICD-10-CM: F31.81  ICD-9-CM: 296.89  2/9/2015 - 3/24/2015              Greater than 54 minutes were spent with the patient on counseling and coordination of care    Signed:   Bryanna Lamb MD  12/4/2017  5:09 PM

## 2017-12-04 NOTE — PROGRESS NOTES
Primary Nurse Maci Nixon RN and Brit Beavers RN performed a dual skin assessment on this patient No impairment noted  Jj score is 23

## 2017-12-04 NOTE — PROGRESS NOTES
SPEECH THERAPY SCREENING:  SERVICES ARE NOT INDICATED AT THIS TIME    [facility] based on results obtained during the nursing admission assessment. The patients chart was reviewed and the patient is not appropriate for a skilled therapy evaluation at this time. Please consult speech therapy if any therapy needs arise. Thank you.     Lizett Jensen, SLP

## 2017-12-04 NOTE — CONSULTS
Chief Complaint:  Abdominal pain    HPI:  41 yo man with hx depression/anxiety present to ED with abdominal pain. Pt reported symptoms developed a couple of hours ago. Pain has been diffuse but more in LUQ and epigastric area. No fever chills. No nausea or vomiting. Lab work has been normal.  CT scan showed incidental cholelithiasis and left nephrolithiasis. Past Medical History:   Diagnosis Date    Depression     Migraines        Past Surgical History:   Procedure Laterality Date    HX ORTHOPAEDIC      knee surgery     HX ORTHOPAEDIC         No current facility-administered medications on file prior to encounter. Current Outpatient Prescriptions on File Prior to Encounter   Medication Sig Dispense Refill    amphetamine-dextroamphetamine XR (ADDERALL XR) 20 mg XR capsule Take 1 Cap by mouth every morning. Max Daily Amount: 20 mg. 30 Cap 0    amphetamine-dextroamphetamine XR (ADDERALL XR) 25 mg XR capsule Take 1 Cap by mouth every morning. Max Daily Amount: 25 mg. 30 Cap 0    vortioxetine (BRINTELLIX) 10 mg tablet Take 1 Tab by mouth daily. 30 Tab 0    ergocalciferol (ERGOCALCIFEROL) 50,000 unit capsule          No Known Allergies    Review of Systems - General ROS: negative  Psychological ROS: negative  Respiratory ROS: negative  Cardiovascular ROS: negative  Gastrointestinal ROS: positive for - abdominal pain     Visit Vitals    BP (!) 140/97    Pulse 90    Temp 97.8 °F (36.6 °C)    Resp 17    Ht 6' 1\" (1.854 m)    Wt 268 lb (121.6 kg)    SpO2 (!) 77%    BMI 35.36 kg/m2         Physical Exam:    Gen:  NAD  Pulm:  Unlabored  Abd:  S/ND/mild TTP in LUQ and epigastric area.   No guarding or rebound    Recent Results (from the past 24 hour(s))   CBC WITH AUTOMATED DIFF    Collection Time: 12/03/17  7:03 PM   Result Value Ref Range    WBC 9.0 4.1 - 11.1 K/uL    RBC 5.16 4.10 - 5.70 M/uL    HGB 16.6 12.1 - 17.0 g/dL    HCT 46.5 36.6 - 50.3 %    MCV 90.1 80.0 - 99.0 FL    MCH 32.2 26.0 - 34.0 PG    MCHC 35.7 30.0 - 36.5 g/dL    RDW 13.1 11.5 - 14.5 %    PLATELET 079 893 - 344 K/uL    NEUTROPHILS 58 32 - 75 %    LYMPHOCYTES 29 12 - 49 %    MONOCYTES 9 5 - 13 %    EOSINOPHILS 4 0 - 7 %    BASOPHILS 0 0 - 1 %    ABS. NEUTROPHILS 5.2 1.8 - 8.0 K/UL    ABS. LYMPHOCYTES 2.6 0.8 - 3.5 K/UL    ABS. MONOCYTES 0.8 0.0 - 1.0 K/UL    ABS. EOSINOPHILS 0.4 0.0 - 0.4 K/UL    ABS. BASOPHILS 0.0 0.0 - 0.1 K/UL   METABOLIC PANEL, COMPREHENSIVE    Collection Time: 12/03/17  7:03 PM   Result Value Ref Range    Sodium 140 136 - 145 mmol/L    Potassium 4.0 3.5 - 5.1 mmol/L    Chloride 105 97 - 108 mmol/L    CO2 27 21 - 32 mmol/L    Anion gap 8 5 - 15 mmol/L    Glucose 100 65 - 100 mg/dL    BUN 12 6 - 20 MG/DL    Creatinine 1.09 0.70 - 1.30 MG/DL    BUN/Creatinine ratio 11 (L) 12 - 20      GFR est AA >60 >60 ml/min/1.73m2    GFR est non-AA >60 >60 ml/min/1.73m2    Calcium 9.6 8.5 - 10.1 MG/DL    Bilirubin, total 1.0 0.2 - 1.0 MG/DL    ALT (SGPT) 54 12 - 78 U/L    AST (SGOT) 28 15 - 37 U/L    Alk.  phosphatase 80 45 - 117 U/L    Protein, total 7.7 6.4 - 8.2 g/dL    Albumin 4.1 3.5 - 5.0 g/dL    Globulin 3.6 2.0 - 4.0 g/dL    A-G Ratio 1.1 1.1 - 2.2     LIPASE    Collection Time: 12/03/17  7:03 PM   Result Value Ref Range    Lipase 89 73 - 393 U/L   TROPONIN I    Collection Time: 12/03/17  7:03 PM   Result Value Ref Range    Troponin-I, Qt. <0.04 <0.05 ng/mL   EKG, 12 LEAD, INITIAL    Collection Time: 12/03/17  8:10 PM   Result Value Ref Range    Ventricular Rate 72 BPM    Atrial Rate 72 BPM    P-R Interval 168 ms    QRS Duration 100 ms    Q-T Interval 382 ms    QTC Calculation (Bezet) 418 ms    Calculated P Axis 48 degrees    Calculated R Axis 22 degrees    Calculated T Axis 39 degrees    Diagnosis Normal sinus rhythm  No previous ECGs available      URINALYSIS W/MICROSCOPIC    Collection Time: 12/03/17  9:22 PM   Result Value Ref Range    Color YELLOW/STRAW      Appearance CLEAR CLEAR      Specific gravity 1.026 1.003 - 1.030 pH (UA) 8.5 (H) 5.0 - 8.0      Protein NEGATIVE  NEG mg/dL    Glucose NEGATIVE  NEG mg/dL    Ketone TRACE (A) NEG mg/dL    Bilirubin NEGATIVE  NEG      Blood NEGATIVE  NEG      Urobilinogen 0.2 0.2 - 1.0 EU/dL    Nitrites NEGATIVE  NEG      Leukocyte Esterase NEGATIVE  NEG      WBC 0-4 0 - 4 /hpf    RBC 0-5 0 - 5 /hpf    Epithelial cells FEW FEW /lpf    Bacteria NEGATIVE  NEG /hpf    Hyaline cast 0-2 0 - 5 /lpf   URINE CULTURE HOLD SAMPLE    Collection Time: 12/03/17  9:22 PM   Result Value Ref Range    Urine culture hold URINE ON HOLD IN MICROBIOLOGY DEPT FOR 3 DAYS         AP:  39 yo man with abdominal pain    - Abdominal pain:  Does not appear to be biliary cholic in nature.    No acute indication for cholecystectomy  - Recommend trial of PPI and carafate for possible peptic ulcer disease  - If pain still persists then recommend medical evaluation for admission and GI consult for EGD  - Consider HIDA with pain persists

## 2017-12-04 NOTE — DISCHARGE INSTRUCTIONS
Discharge Instructions       PATIENT ID: Trang Medina  MRN: [id number]   YOB: 1980    DATE OF ADMISSION: 12/3/2017  7:18 PM    DATE OF DISCHARGE: 12/4/2017    PRIMARY CARE PROVIDER: Popeye Worthy MD     ATTENDING PHYSICIAN: Jorge Silva MD  DISCHARGING PROVIDER: Jorge Silva MD    To contact this individual call [phone] and ask the  to page. If unavailable ask to be transferred the Adult Hospitalist Department. DISCHARGE DIAGNOSES   Abdominal pain    CONSULTATIONS: IP CONSULT TO GENERAL SURGERY  IP CONSULT TO HOSPITALIST  IP CONSULT TO GASTROENTEROLOGY    PROCEDURES/SURGERIES: Procedure(s):  ESOPHAGOGASTRODUODENOSCOPY (EGD)    PENDING TEST RESULTS:   At the time of discharge the following test results are still pending: none    FOLLOW UP APPOINTMENTS:   Follow-up Information     Follow up With Details Comments [address], [place] In 1 week  [address]  [phone]             ADDITIONAL CARE RECOMMENDATIONS:   Follow up with Gi in 1 week    DIET: Cardiac Diet    ACTIVITY: Activity as tolerated      DISCHARGE MEDICATIONS:   See Medication Reconciliation Form    · It is important that you take the medication exactly as they are prescribed. · Keep your medication in the bottles provided by the pharmacist and keep a list of the medication names, dosages, and times to be taken in your wallet. · Do not take other medications without consulting your doctor. NOTIFY YOUR PHYSICIAN FOR ANY OF THE FOLLOWING:   Fever over 101 degrees for 24 hours. Chest pain, shortness of breath, fever, chills, nausea, vomiting, diarrhea, change in mentation, falling, weakness, bleeding. Severe pain or pain not relieved by medications. Or, any other signs or symptoms that you may have questions about.       DISPOSITION:  x  Home With:   OT  PT  HH  RN       SNF/Inpatient Rehab/LTAC    Independent/assisted living    Hospice    Other: CDMP Checked:   Yes x     PROBLEM LIST Updated:  Yes x       Signed:   Oksana Bundy MD  2017  5:08 PM    MyChart Activation    Thank you for requesting access to [facility]. Please follow the instructions below to securely access and download your online medical record. [facility] [employer] allows you to send messages to your doctor, view your test results, renew your prescriptions, schedule appointments, and more. How Do I Sign Up? 1. In your internet browser, go to [email or web address]  2. Click on the First Time User? Click Here link in the Sign In box. You will be redirect to the New Member Sign Up page. 3. Enter your [facility] Access Code exactly as it appears below. You will not need to use this code after youve completed the sign-up process. If you do not sign up before the expiration date, you must request a new code. [facility] [employer] Access Code: [email or web address]  Expires: 3/4/2018 10:12 AM (This is the date your [facility] [employer] access code will )    4. Enter the last four digits of your Social Security Number (xxxx) and Date of Birth (mm/dd/yyyy) as indicated and click Submit. You will be taken to the next sign-up page. 5. Create a [facility] [employer] ID. This will be your [facility] [employer] login ID and cannot be changed, so think of one that is secure and easy to remember. 6. Create a [facility] [employer] password. You can change your password at any time. 7. Enter your Password Reset Question and Answer. This can be used at a later time if you forget your password. 8. Enter your e-mail address. You will receive e-mail notification when new information is available in [address] 9. Click Sign Up. You can now view and download portions of your medical record. 10. Click the Download Summary menu link to download a portable copy of your medical information. Additional Information    If you have questions, please visit the Frequently Asked Questions section of the [employer] website at [email or web address] Remember, [facility] is NOT to be used for urgent needs. For medical emergencies, dial 911. Pantoprazole (Protonix) - (By mouth)   Why this medicine is used:   Treats gastroesophageal reflux disease (GERD), a damaged esophagus, and high levels of stomach acid. Contact a nurse or doctor right away if you have:  · Blistering, peeling, red skin rash  · Swelling, muscle pain, stiffness, cramps, or twitching  · Joint pain, rash on your cheeks or arms that gets worse in the sun  · Dark-colored urine, change in how much or how often you urinate  · Seizures, dizziness, uneven heartbeat  · Severe diarrhea, stomach cramps, fever, weight gain     Common side effects:  · Mild diarrhea, stomach pain  · Headache, tiredness  © 2017 [facility] Information is for End User's use only and may not be sold, redistributed or otherwise used for commercial purposes. Hydrocodone/Acetaminophen (Vicodin, Norco, Lortab) - (By mouth)   Why this medicine is used:   Treats pain. Contact a nurse or doctor right away if you have:  · Blistering, peeling, red skin rash  · Fast or slow heartbeat, shallow breathing, blue lips, fingernails, or skin  · Anxiety, restlessness, muscle spasms, twitching, seeing or hearing things that are not there  · Dark urine or pale stools, yellow skin or eyes  · Extreme weakness, sweating, seizures, cold or clammy skin  · Lightheadedness, dizziness, fainting, fever, sweating     Common side effects:  · Constipation, nausea, vomiting, loss of appetite, stomach pain  · Tiredness or sleepiness  © 2017 [facility] Information is for End User's use only and may not be sold, redistributed or otherwise used for commercial purposes.

## 2017-12-04 NOTE — ROUTINE PROCESS
TRANSFER - OUT REPORT:    Verbal report given to MOHIT Schmid(name) on [facility] Adam  being transferred to 608(unit) for routine progression of care       Report consisted of patients Situation, Background, Assessment and   Recommendations(SBAR). Information from the following report(s) SBAR was reviewed with the receiving nurse. Lines:   Peripheral IV 12/03/17 Right Antecubital (Active)   Site Assessment Clean, dry, & intact 12/3/2017  7:07 PM   Phlebitis Assessment 0 12/3/2017  7:07 PM   Infiltration Assessment 0 12/3/2017  7:07 PM   Dressing Status Clean, dry, & intact 12/3/2017  7:07 PM   Dressing Type Transparent 12/3/2017  7:07 PM   Hub Color/Line Status Green;Flushed;Patent 12/3/2017  7:07 PM   Action Taken Blood drawn 12/3/2017  7:07 PM        Opportunity for questions and clarification was provided.

## 2017-12-04 NOTE — PROGRESS NOTES
Bedside shift change report given to Etienne Harley (oncoming nurse) by Gema Higgins (offgoing nurse). Report included the following information SBAR.     [address] [place], NP that pt refusing to be scoped today. 1 Notified Dr. Trang Lind that pt refusing to be scoped today. MD to see pt.     1723 I have reviewed discharge instructions with the patient. The patient verbalized understanding. 26 Pt states he would like pain med for home, but will not stay more than 30 mins to get prescription. [address] to Dr. Trang Lind about pain med for pt for discharge. MD to order Dave Alegre.     1085 Pt left hospital without Marivel bass MD made aware.

## 2017-12-04 NOTE — CONSULTS
[facility].  [address] [facility] [address]  [place], [address]  [phone]                     GI CONSULTATION NOTE  Glynn Fletcher AGACNP-BC  Work Cell: [phone]      NAME:  Tirstan Rodriguez   :   1980   MRN:   [id number]       Referring Provider: Dr. Camryn Gonzalez Date: 2017     Chief Complaint:     History of Present Illness:  Patient is a 40 y.o. who is seen in consultation at the request of Dr. Raquel Murphy for epigastric pain. Mr. Katherine Aj has a PMH as detailed below. He presented to the hospital with abdominal pain. Onset of this was yesterday evening. Pain is generalized and described as \"his stomach ripping out of his stomach. \" It feels like a throbbing pain. No specific triggers or alleviating factors. He denies any associated fever, chills, nausea, vomiting, change in bowel habits, black or bloody stools. He reports similar episodes of pain the past which have resolved without intervention. He denies any ASA, NSAID or tobacco use. He reports rare ETOH use. No prior EGD. Labs all unremarkable. CT scan demonstrated cholelithiasis. He was seen by surgery whom recommended trial of PPI. He continues to have pain today. PMH:  Past Medical History:   Diagnosis Date    Depression     Migraines        PSH:  Past Surgical History:   Procedure Laterality Date    HX ORTHOPAEDIC      knee surgery     HX ORTHOPAEDIC         Allergies:  No Known Allergies    Home Medications:  Prior to Admission Medications   Prescriptions Last Dose Informant Patient Reported? Taking? amphetamine-dextroamphetamine XR (ADDERALL XR) 20 mg XR capsule Not Taking at Unknown time  No No   Sig: Take 1 Cap by mouth every morning. Max Daily Amount: 20 mg.   amphetamine-dextroamphetamine XR (ADDERALL XR) 25 mg XR capsule Not Taking at Unknown time  No No   Sig: Take 1 Cap by mouth every morning.  Max Daily Amount: 25 mg.   ergocalciferol (ERGOCALCIFEROL) 50,000 unit capsule Not Taking at Unknown time  Yes No vortioxetine (BRINTELLIX) 10 mg tablet Not Taking at Unknown time  No No   Sig: Take 1 Tab by mouth daily. Facility-Administered Medications: None       Hospital Medications:  Current Facility-Administered Medications   Medication Dose Route Frequency    acetaminophen (TYLENOL) tablet 650 mg  650 mg Oral Q6H PRN    HYDROcodone-acetaminophen (NORCO) 5-325 mg per tablet 1 Tab  1 Tab Oral Q6H PRN    morphine injection 2 mg  2 mg IntraVENous Q4H PRN    pantoprazole (PROTONIX) tablet 40 mg  40 mg Oral ACB    sodium chloride (NS) flush 5-10 mL  5-10 mL IntraVENous Q8H    sodium chloride (NS) flush 5-10 mL  5-10 mL IntraVENous PRN    ondansetron (ZOFRAN) injection 4 mg  4 mg IntraVENous Q4H PRN       Social History:  Social History   Substance Use Topics    Smoking status: Never Smoker    Smokeless tobacco: Never Used    Alcohol use Yes       Family History:  Family History   Problem Relation Age of Onset    Stroke Maternal Grandmother     Cancer Maternal Grandfather        Review of Systems:    Constitutional: negative fever, negative chills, negative weight loss  Eyes:   negative visual changes  ENT:   negative sore throat, tongue or lip swelling  Respiratory:  negative cough, negative dyspnea  Cards:  negative for chest pain, palpitations, lower extremity edema  GI:   See HPI  :  negative for frequency, dysuria  Integument:  negative for rash and pruritus  Heme:  negative for easy bruising and gum/nose bleeding  Musculoskel: negative for myalgias, back pain and muscle weakness  Neuro: negative for headaches, dizziness, vertigo  Psych:  negative for feelings of anxiety, depression      Objective:   Patient Vitals for the past 8 hrs:   BP Temp Pulse Resp SpO2   12/04/17 1026 121/82 98 °F (36.7 °C) 74 16 96 %               PHYSICAL EXAM:  General: WD, WN. Alert, cooperative, no acute distress.    HEENT: NC, Atraumatic. PERRLA, EOMI. Anicteric sclerae. Lungs:  CTA Bilaterally.  No Wheezing/Rhonchi/Rales. Heart:  Regular rate and rhythm, No murmur, No Rubs, No Gallops  Abdomen: Soft, non-distended, mild epigastric tenderness.  +Bowel sounds, no HSM  Extremities: No c/c/e  Neurologic:  Alert and oriented X 3. No acute neurological distress. Psych:   Good insight. Not anxious nor agitated. Data Review     Recent Labs      12/03/17 1903   WBC  9.0   HGB  16.6   HCT  46.5   PLT  218     Recent Labs      12/03/17 1903   NA  140   K  4.0   CL  105   CO2  27   BUN  12   CREA  1.09   GLU  100   CA  9.6     Recent Labs      12/03/17 1903   SGOT  28   AP  80   TP  7.7   ALB  4.1   GLOB  3.6   LPSE  89     No results for input(s): INR, PTP, APTT in the last 72 hours. No lab exists for component: INREXT     Imaging studies reviewed      Assessment:   1. Epigastric pain - with previous history of the same, differentials include PUD, erosions, gastritis, esophagitis or biliary colic/dyskinesia. Patient Active Problem List   Diagnosis Code    Anxiety disorder F41.9    Major depressive disorder, recurrent (HCC) F33.9    Anxiety state, unspecified F41.1    Posttraumatic stress disorder F43.10    Attention deficit disorder without mention of hyperactivity F98.8    Borderline personality disorder F60.3    Abdominal pain R10.9              Plan:   -Keep NPO   -Supportive management per primary team  -Continue PPI  -HIDA scan pending  -Plan for EGD this afternoon  -Further recommendations to follow  -Discussed with Dr. Aurea Feldman  -Will follow along with you  -Thank you kindly for allowing us to participate in the care of this patient      I have personally reviewed the history and independently examined the patient. I have reviewed the chart and agree with the documentation recorded by the Mid Level Provider, including the assessment, treatment plan, and disposition. ASSESSMENT AND PLAN:  Patient presented with epigastric pain with no associated symptoms.  Abd exam - BS+, soft, non-distended, mild epigastric tenderness, no rebound or gaurding. EGD recommended for further evaluation but patient refused it. Continue PPI. Advance diet as tolerated.  Plan for EGD if symptoms persist.    Leslie Mcnamara MD

## 2017-12-04 NOTE — ED PROVIDER NOTES
HPI Comments: 39 yo male with hx of migraines and depression here for evaluation of abdominal pain. States he began with sudden severe generalized abdominal pain approximately 40 minutes ago. Pain to mid abdomen and \"all over\". Hx of similar with workup at [facility] without diagnosis per pt. Denies fever, N/V/D, CP, SOB, flank pain, urinary symptoms. Non smoker. Patient is a 40 y.o. male presenting with abdominal pain. The history is provided by the patient. Abdominal Pain    The current episode started less than 1 hour ago. The problem occurs constantly. The pain is located in the generalized abdominal region. The pain is at a severity of 10/10. The pain is moderate. Pertinent negatives include no anorexia, no fever, no diarrhea, no vomiting, no constipation, no dysuria, no frequency, no hematuria, no headaches, no myalgias, no chest pain and no back pain. Nothing worsens the pain. The pain is relieved by nothing. Past Medical History:   Diagnosis Date    Depression     Migraines        Past Surgical History:   Procedure Laterality Date    HX ORTHOPAEDIC      knee surgery     HX ORTHOPAEDIC           Family History:   Problem Relation Age of Onset    Stroke Maternal Grandmother     Cancer Maternal Grandfather        Social History     Social History    Marital status: SINGLE     Spouse name: N/A    Number of children: N/A    Years of education: N/A     Occupational History    Not on file. Social History Main Topics    Smoking status: Never Smoker    Smokeless tobacco: Never Used    Alcohol use Yes    Drug use: No    Sexual activity: Not on file     Other Topics Concern    Not on file     Social History Narrative    Luli Ocampo, 29year old slim  male, is the only child of [facility], ,  and living in [place]. They are \"friends now\", but there is a history of abandonment and non-nurturance. He was raised by his maternal grandparents from age [age].   Grandmother  suddenly in 2000. He  Ady in 2006 after they had a son, Timoteo Santiago 8, and they had a second child, Giorgio King, 6. They  in 2008 and  in 2013. He sees his children every other weekend and one night/week. Moved to [place] from [place] in 2007. He has been in his current relationship with Bobby Peacock, since summer, [id number]. She has 2 children. Georganna Rubinstein works part-time, from home now, for [employer]. ALLERGIES: Review of patient's allergies indicates no known allergies. Review of Systems   Constitutional: Negative. Negative for fever. HENT: Negative for ear discharge. Eyes: Negative for photophobia, pain, discharge and visual disturbance. Respiratory: Negative for apnea, cough, chest tightness and shortness of breath. Cardiovascular: Negative for chest pain, palpitations and leg swelling. Gastrointestinal: Positive for abdominal pain. Negative for abdominal distention, anorexia, blood in stool, constipation, diarrhea and vomiting. Genitourinary: Negative for difficulty urinating, dysuria, flank pain, frequency and hematuria. Musculoskeletal: Negative for back pain, gait problem, joint swelling, myalgias and neck pain. Skin: Negative for color change and pallor. Neurological: Negative for dizziness, syncope, weakness, numbness and headaches. Psychiatric/Behavioral: Negative for behavioral problems and confusion. The patient is not nervous/anxious. Vitals:    12/03/17 1855   BP: 147/88   Pulse: 90   Resp: 17   Temp: 97.8 °F (36.6 °C)   SpO2: 99%   Weight: 121.6 kg (268 lb)   Height: 6' 1\" (1.854 m)            Physical Exam   Constitutional: He is oriented to person, place, and time. He appears well-developed and well-nourished. He appears distressed. Pt on floor in room   HENT:   Head: Normocephalic and atraumatic.    Right Ear: External ear normal.   Left Ear: External ear normal.   Nose: Nose normal.   Mouth/Throat: Oropharynx is clear and moist. Eyes: Conjunctivae and EOM are normal. Pupils are equal, round, and reactive to light. Right eye exhibits no discharge. Left eye exhibits no discharge. Neck: Normal range of motion. Neck supple. Cardiovascular: Normal rate, regular rhythm, normal heart sounds and intact distal pulses. Pulmonary/Chest: Effort normal and breath sounds normal.   Abdominal: Soft. Bowel sounds are normal. He exhibits no distension. There is tenderness (Generalized). There is no rebound and no guarding. No flank tenderness   Musculoskeletal: Normal range of motion. He exhibits no edema or tenderness. Neurological: He is alert and oriented to person, place, and time. No cranial nerve deficit. Coordination normal.   Skin: Skin is warm and dry. No rash noted. Psychiatric: He has a normal mood and affect. His behavior is normal. Judgment and thought content normal.   Nursing note and vitals reviewed. MDM  Number of Diagnoses or Management Options  Diagnosis management comments: 41 yo male with sudden on set abd pain; given multiple meds in ER; mild to no relief; Surgery in to see; no surgical abdomen; will admit for intractable pain and have GI consulted in am. JUSTIN Rosales         Amount and/or Complexity of Data Reviewed  Clinical lab tests: ordered and reviewed  Tests in the radiology section of CPT®: ordered and reviewed  Discuss the patient with other providers: yes  Independent visualization of images, tracings, or specimens: yes      ED Course       Procedures    ED EKG interpretation:  Rhythm: normal sinus rhythm; and regular . Rate (approx.): 72; Axis: normal; P wave: normal; QRS interval: normal ; no acute ST changes. This EKG was interpreted by Dr. Duy Del Castillo and documented by Vinton Denver. Luis E Nicole PA-C,ED Provider. Pt remains in pain; additional orders given. JUSTIN Rosales    Discussed case with attending Physician Duy Del Castillo. Agrees with care and plan.  JUSTIN Rosales     Pt remains in pain; spoke to surgery  Bryce; in to see; no surgical abdomen; will have hospitalist to admit with GI consult for am. JUSTIN Lassiter    Spoke to Hospitalist Dr Vadim Arce; will admit. JUSTIN Lassiter     Pt agreeable to admission.  JUSTIN Lassiter

## 2022-03-18 PROBLEM — R10.9 ABDOMINAL PAIN: Status: ACTIVE | Noted: 2017-12-03

## 2022-11-04 ENCOUNTER — HOSPITAL ENCOUNTER (EMERGENCY)
Age: 42
Discharge: HOME | End: 2022-11-04
Attending: STUDENT IN AN ORGANIZED HEALTH CARE EDUCATION/TRAINING PROGRAM
Payer: MEDICAID

## 2022-11-04 ENCOUNTER — APPOINTMENT (OUTPATIENT)
Dept: CT IMAGING | Age: 42
End: 2022-11-04
Attending: STUDENT IN AN ORGANIZED HEALTH CARE EDUCATION/TRAINING PROGRAM
Payer: MEDICAID

## 2022-11-04 VITALS
TEMPERATURE: 98.5 F | WEIGHT: 265 LBS | BODY MASS INDEX: 35.12 KG/M2 | SYSTOLIC BLOOD PRESSURE: 138 MMHG | HEART RATE: 107 BPM | HEIGHT: 73 IN | OXYGEN SATURATION: 97 % | RESPIRATION RATE: 22 BRPM | DIASTOLIC BLOOD PRESSURE: 97 MMHG

## 2022-11-04 DIAGNOSIS — N20.0 KIDNEY STONE: Primary | ICD-10-CM

## 2022-11-04 LAB
ALBUMIN SERPL-MCNC: 3.8 G/DL (ref 3.5–5)
ALBUMIN/GLOB SERPL: 1.3 {RATIO} (ref 1.1–2.2)
ALP SERPL-CCNC: 75 U/L (ref 45–117)
ALT SERPL-CCNC: 39 U/L (ref 12–78)
ANION GAP SERPL CALC-SCNC: 5 MMOL/L (ref 5–15)
APPEARANCE UR: ABNORMAL
AST SERPL-CCNC: 26 U/L (ref 15–37)
BACTERIA URNS QL MICRO: NEGATIVE /HPF
BASOPHILS # BLD: 0 K/UL (ref 0–0.1)
BASOPHILS NFR BLD: 0 % (ref 0–1)
BILIRUB SERPL-MCNC: 1 MG/DL (ref 0.2–1)
BILIRUB UR QL: NEGATIVE
BUN SERPL-MCNC: 18 MG/DL (ref 6–20)
BUN/CREAT SERPL: 14 (ref 12–20)
CALCIUM SERPL-MCNC: 10.1 MG/DL (ref 8.5–10.1)
CHLORIDE SERPL-SCNC: 111 MMOL/L (ref 97–108)
CO2 SERPL-SCNC: 27 MMOL/L (ref 21–32)
COLOR UR: ABNORMAL
CREAT SERPL-MCNC: 1.27 MG/DL (ref 0.7–1.3)
DIFFERENTIAL METHOD BLD: NORMAL
EOSINOPHIL # BLD: 0.2 K/UL (ref 0–0.4)
EOSINOPHIL NFR BLD: 2 % (ref 0–7)
EPITH CASTS URNS QL MICRO: ABNORMAL /LPF
ERYTHROCYTE [DISTWIDTH] IN BLOOD BY AUTOMATED COUNT: 12.2 % (ref 11.5–14.5)
GLOBULIN SER CALC-MCNC: 2.9 G/DL (ref 2–4)
GLUCOSE SERPL-MCNC: 107 MG/DL (ref 65–100)
GLUCOSE UR STRIP.AUTO-MCNC: NEGATIVE MG/DL
HCT VFR BLD AUTO: 43.5 % (ref 36.6–50.3)
HGB BLD-MCNC: 15.2 G/DL (ref 12.1–17)
HGB UR QL STRIP: ABNORMAL
HYALINE CASTS URNS QL MICRO: ABNORMAL /LPF (ref 0–2)
IMM GRANULOCYTES # BLD AUTO: 0 K/UL (ref 0–0.04)
IMM GRANULOCYTES NFR BLD AUTO: 0 % (ref 0–0.5)
KETONES UR QL STRIP.AUTO: ABNORMAL MG/DL
LEUKOCYTE ESTERASE UR QL STRIP.AUTO: ABNORMAL
LYMPHOCYTES # BLD: 1.8 K/UL (ref 0.8–3.5)
LYMPHOCYTES NFR BLD: 18 % (ref 12–49)
MCH RBC QN AUTO: 31.4 PG (ref 26–34)
MCHC RBC AUTO-ENTMCNC: 34.9 G/DL (ref 30–36.5)
MCV RBC AUTO: 89.9 FL (ref 80–99)
MONOCYTES # BLD: 0.7 K/UL (ref 0–1)
MONOCYTES NFR BLD: 8 % (ref 5–13)
NEUTS SEG # BLD: 6.8 K/UL (ref 1.8–8)
NEUTS SEG NFR BLD: 72 % (ref 32–75)
NITRITE UR QL STRIP.AUTO: NEGATIVE
NRBC # BLD: 0 K/UL (ref 0–0.01)
NRBC BLD-RTO: 0 PER 100 WBC
PH UR STRIP: 5 [PH] (ref 5–8)
PLATELET # BLD AUTO: 216 K/UL (ref 150–400)
PMV BLD AUTO: 10.1 FL (ref 8.9–12.9)
POTASSIUM SERPL-SCNC: 3.5 MMOL/L (ref 3.5–5.1)
PROT SERPL-MCNC: 6.7 G/DL (ref 6.4–8.2)
PROT UR STRIP-MCNC: 30 MG/DL
RBC # BLD AUTO: 4.84 M/UL (ref 4.1–5.7)
RBC #/AREA URNS HPF: >100 /HPF (ref 0–5)
SODIUM SERPL-SCNC: 143 MMOL/L (ref 136–145)
SP GR UR REFRACTOMETRY: 1.02 (ref 1–1.03)
UA: UC IF INDICATED,UAUC: ABNORMAL
UROBILINOGEN UR QL STRIP.AUTO: 0.2 EU/DL (ref 0.2–1)
WBC # BLD AUTO: 9.5 K/UL (ref 4.1–11.1)
WBC URNS QL MICRO: ABNORMAL /HPF (ref 0–4)

## 2022-11-04 PROCEDURE — 99284 EMERGENCY DEPT VISIT MOD MDM: CPT

## 2022-11-04 PROCEDURE — 85025 COMPLETE CBC W/AUTO DIFF WBC: CPT

## 2022-11-04 PROCEDURE — 96361 HYDRATE IV INFUSION ADD-ON: CPT

## 2022-11-04 PROCEDURE — 74176 CT ABD & PELVIS W/O CONTRAST: CPT

## 2022-11-04 PROCEDURE — 74011250637 HC RX REV CODE- 250/637: Performed by: EMERGENCY MEDICINE

## 2022-11-04 PROCEDURE — 81001 URINALYSIS AUTO W/SCOPE: CPT

## 2022-11-04 PROCEDURE — 96374 THER/PROPH/DIAG INJ IV PUSH: CPT

## 2022-11-04 PROCEDURE — 51798 US URINE CAPACITY MEASURE: CPT

## 2022-11-04 PROCEDURE — 74011250636 HC RX REV CODE- 250/636: Performed by: STUDENT IN AN ORGANIZED HEALTH CARE EDUCATION/TRAINING PROGRAM

## 2022-11-04 PROCEDURE — 80053 COMPREHEN METABOLIC PANEL: CPT

## 2022-11-04 PROCEDURE — 36415 COLL VENOUS BLD VENIPUNCTURE: CPT

## 2022-11-04 RX ORDER — HYDROCODONE BITARTRATE AND ACETAMINOPHEN 5; 325 MG/1; MG/1
1 TABLET ORAL
Status: COMPLETED | OUTPATIENT
Start: 2022-11-04 | End: 2022-11-04

## 2022-11-04 RX ORDER — NAPROXEN 500 MG/1
500 TABLET ORAL 2 TIMES DAILY WITH MEALS
Qty: 20 TABLET | Refills: 0 | Status: SHIPPED | OUTPATIENT
Start: 2022-11-04

## 2022-11-04 RX ORDER — TAMSULOSIN HYDROCHLORIDE 0.4 MG/1
0.4 CAPSULE ORAL DAILY
Qty: 15 CAPSULE | Refills: 0 | Status: SHIPPED | OUTPATIENT
Start: 2022-11-04 | End: 2022-11-19

## 2022-11-04 RX ORDER — KETOROLAC TROMETHAMINE 30 MG/ML
15 INJECTION, SOLUTION INTRAMUSCULAR; INTRAVENOUS
Status: COMPLETED | OUTPATIENT
Start: 2022-11-04 | End: 2022-11-04

## 2022-11-04 RX ORDER — HYDROCODONE BITARTRATE AND ACETAMINOPHEN 5; 325 MG/1; MG/1
1 TABLET ORAL
Qty: 10 TABLET | Refills: 0 | Status: SHIPPED | OUTPATIENT
Start: 2022-11-04 | End: 2022-11-07

## 2022-11-04 RX ORDER — TAMSULOSIN HYDROCHLORIDE 0.4 MG/1
0.4 CAPSULE ORAL
Status: COMPLETED | OUTPATIENT
Start: 2022-11-04 | End: 2022-11-04

## 2022-11-04 RX ORDER — ONDANSETRON 4 MG/1
4 TABLET, FILM COATED ORAL
Qty: 20 TABLET | Refills: 0 | Status: SHIPPED | OUTPATIENT
Start: 2022-11-04

## 2022-11-04 RX ADMIN — SODIUM CHLORIDE 1000 ML: 9 INJECTION, SOLUTION INTRAVENOUS at 21:09

## 2022-11-04 RX ADMIN — HYDROCODONE BITARTRATE AND ACETAMINOPHEN 1 TABLET: 5; 325 TABLET ORAL at 23:17

## 2022-11-04 RX ADMIN — KETOROLAC TROMETHAMINE 15 MG: 30 INJECTION, SOLUTION INTRAMUSCULAR; INTRAVENOUS at 21:09

## 2022-11-04 RX ADMIN — TAMSULOSIN HYDROCHLORIDE 0.4 MG: 0.4 CAPSULE ORAL at 23:17

## 2022-11-04 NOTE — Clinical Note
[address]  [facility] EMERGENCY DEPT  [address] [id number]  [phone]    Work/School Note    Date: 11/4/2022    To Whom It May concern:    Hunter Gonzales was seen and treated today in the emergency room by the following provider(s):  Attending Provider: Jermain Mota MD.      Hunter Gonzales is excused from work/school on 11/04/22 and 11/05/22. He is medically clear to return to work/school on 11/6/2022.        Sincerely,          Ada Cabrera MD

## 2022-11-05 NOTE — ED PROVIDER NOTES
EMERGENCY DEPARTMENT HISTORY AND PHYSICAL EXAM      Date: 11/4/2022  Patient Name: Reina Rojas    History of Presenting Illness     HPI: Reina Rojas, 43 y.o. male presents to the ED with cc of suprapubic pressure/pain, sensation of urinary retention, and hematuria. Patient reports he has recently been experiencing L flank pain over the past 2 weeks. However, did not begin experiencing abnormal urination until today. Reports frequent urination with urgency throughout the day today. Last urinated approximately 2 hours ago. Continues to currently experience notable urgency, but unable to urinate. Has also had hematuria all day. Currently rates suprapubic pressure/pain as 8/10. No flank pain at this time. No N/V. No F/C. Patient denies prior history of kidney stones. PCP: Tracy Lr MD    No current facility-administered medications on file prior to encounter. Current Outpatient Medications on File Prior to Encounter   Medication Sig Dispense Refill    pantoprazole (PROTONIX) 40 mg tablet Take 1 Tab by mouth Daily (before breakfast). 30 Tab 0    HYDROcodone-acetaminophen (NORCO) 5-325 mg per tablet Take 1 Tab by mouth every six (6) hours as needed. Max Daily Amount: 4 Tabs. 10 Tab 0    amphetamine-dextroamphetamine XR (ADDERALL XR) 20 mg XR capsule Take 1 Cap by mouth every morning. Max Daily Amount: 20 mg. 30 Cap 0    amphetamine-dextroamphetamine XR (ADDERALL XR) 25 mg XR capsule Take 1 Cap by mouth every morning. Max Daily Amount: 25 mg. 30 Cap 0    vortioxetine (BRINTELLIX) 10 mg tablet Take 1 Tab by mouth daily.  30 Tab 0    ergocalciferol (ERGOCALCIFEROL) 50,000 unit capsule          Past History     Past Medical History:  Past Medical History:   Diagnosis Date    Depression     Migraines        Past Surgical History:  Past Surgical History:   Procedure Laterality Date    HX ORTHOPAEDIC      knee surgery     HX ORTHOPAEDIC         Family History:  Family History   Problem Relation Age of Onset Stroke Maternal Grandmother     Cancer Maternal Grandfather        Social History:  Social History     Tobacco Use    Smoking status: Never    Smokeless tobacco: Never   Substance Use Topics    Alcohol use: Yes    Drug use: No       Allergies: Allergies   Allergen Reactions    Chicken Derived Unknown (comments)    Egg Nausea Only    Pineapple Unknown (comments)         Review of Systems   Review of Systems   Constitutional:  Negative for chills and fever. HENT:  Negative for congestion and rhinorrhea. Eyes:  Negative for visual disturbance. Respiratory:  Negative for chest tightness and shortness of breath. Cardiovascular:  Negative for chest pain and palpitations. Gastrointestinal:  Positive for abdominal pain. Negative for diarrhea, nausea and vomiting. Genitourinary:  Positive for difficulty urinating, hematuria and urgency. Negative for dysuria and flank pain. Musculoskeletal:  Negative for back pain and neck pain. Skin:  Negative for rash. Allergic/Immunologic: Negative for immunocompromised state. Neurological:  Negative for dizziness, speech difficulty, weakness and headaches. Hematological:  Negative for adenopathy. Psychiatric/Behavioral:  Negative for dysphoric mood and suicidal ideas. Physical Exam   Physical Exam  Vitals and nursing note reviewed. Constitutional:       General: He is not in acute distress. Appearance: Normal appearance. He is not ill-appearing or toxic-appearing. HENT:      Head: Normocephalic and atraumatic. Nose: Nose normal.      Mouth/Throat:      Mouth: Mucous membranes are moist.   Eyes:      Extraocular Movements: Extraocular movements intact. Pupils: Pupils are equal, round, and reactive to light. Cardiovascular:      Rate and Rhythm: Normal rate and regular rhythm. Pulses: Normal pulses. Pulmonary:      Effort: Pulmonary effort is normal. No respiratory distress. Breath sounds: Normal breath sounds. No stridor. No wheezing or rhonchi. Abdominal:      General: Abdomen is flat. There is no distension. Palpations: There is no mass. Tenderness: There is no abdominal tenderness. There is no right CVA tenderness or left CVA tenderness. Musculoskeletal:         General: Normal range of motion. Cervical back: Normal range of motion and neck supple. Right lower leg: No edema. Left lower leg: No edema. Skin:     General: Skin is warm and dry. Neurological:      General: No focal deficit present. Mental Status: He is alert. Mental status is at baseline. Sensory: No sensory deficit. Motor: No weakness. Diagnostic Study Results     Labs -     Recent Results (from the past 24 hour(s))   CBC WITH AUTOMATED DIFF    Collection Time: 11/04/22  9:23 PM   Result Value Ref Range    WBC 9.5 4.1 - 11.1 K/uL    RBC 4.84 4.10 - 5.70 M/uL    HGB 15.2 12.1 - 17.0 g/dL    HCT 43.5 36.6 - 50.3 %    MCV 89.9 80.0 - 99.0 FL    MCH 31.4 26.0 - 34.0 PG    MCHC 34.9 30.0 - 36.5 g/dL    RDW 12.2 11.5 - 14.5 %    PLATELET 845 434 - 528 K/uL    MPV 10.1 8.9 - 12.9 FL    NRBC 0.0 0  WBC    ABSOLUTE NRBC 0.00 0.00 - 0.01 K/uL    NEUTROPHILS 72 32 - 75 %    LYMPHOCYTES 18 12 - 49 %    MONOCYTES 8 5 - 13 %    EOSINOPHILS 2 0 - 7 %    BASOPHILS 0 0 - 1 %    IMMATURE GRANULOCYTES 0 0.0 - 0.5 %    ABS. NEUTROPHILS 6.8 1.8 - 8.0 K/UL    ABS. LYMPHOCYTES 1.8 0.8 - 3.5 K/UL    ABS. MONOCYTES 0.7 0.0 - 1.0 K/UL    ABS. EOSINOPHILS 0.2 0.0 - 0.4 K/UL    ABS. BASOPHILS 0.0 0.0 - 0.1 K/UL    ABS. IMM.  GRANS. 0.0 0.00 - 0.04 K/UL    DF AUTOMATED     METABOLIC PANEL, COMPREHENSIVE    Collection Time: 11/04/22  9:23 PM   Result Value Ref Range    Sodium 143 136 - 145 mmol/L    Potassium 3.5 3.5 - 5.1 mmol/L    Chloride 111 (H) 97 - 108 mmol/L    CO2 27 21 - 32 mmol/L    Anion gap 5 5 - 15 mmol/L    Glucose 107 (H) 65 - 100 mg/dL    BUN 18 6 - 20 MG/DL    Creatinine 1.27 0.70 - 1.30 MG/DL    BUN/Creatinine ratio 14 12 - 20      eGFR >60 >60 ml/min/1.73m2    Calcium 10.1 8.5 - 10.1 MG/DL    Bilirubin, total 1.0 0.2 - 1.0 MG/DL    ALT (SGPT) 39 12 - 78 U/L    AST (SGOT) 26 15 - 37 U/L    Alk. phosphatase 75 45 - 117 U/L    Protein, total 6.7 6.4 - 8.2 g/dL    Albumin 3.8 3.5 - 5.0 g/dL    Globulin 2.9 2.0 - 4.0 g/dL    A-G Ratio 1.3 1.1 - 2.2     URINALYSIS W/ REFLEX CULTURE    Collection Time: 11/04/22 10:00 PM    Specimen: Urine   Result Value Ref Range    Color YELLOW/STRAW      Appearance CLOUDY (A) CLEAR      Specific gravity 1.018 1.003 - 1.030      pH (UA) 5.0 5.0 - 8.0      Protein 30 (A) NEG mg/dL    Glucose Negative NEG mg/dL    Ketone TRACE (A) NEG mg/dL    Bilirubin Negative NEG      Blood LARGE (A) NEG      Urobilinogen 0.2 0.2 - 1.0 EU/dL    Nitrites Negative NEG      Leukocyte Esterase TRACE (A) NEG      UA:UC IF INDICATED PENDING     WBC 5-10 0 - 4 /hpf    RBC >100 (H) 0 - 5 /hpf    Epithelial cells FEW FEW /lpf    Bacteria Negative NEG /hpf    Hyaline cast 2-5 0 - 2 /lpf       Radiologic Studies -   CT ABD PELV WO CONT    (Results Pending)     CT Results  (Last 48 hours)      None          CXR Results  (Last 48 hours)      None            Medical Decision Making   I, Namrata Ray MD-- am the first provider for this patient, and I am the attending of record for this patient encounter. I reviewed the vital signs, available nursing notes, past medical history, past surgical history, family history and social history. Vital Signs-Reviewed the patient's vital signs. Patient Vitals for the past 24 hrs:   Temp Pulse Resp BP SpO2   11/04/22 2045 98.5 °F (36.9 °C) (!) 107 22 (!) 138/97 97 %     Records Reviewed: Nursing Notes and Old Medical Records    Provider Notes (Medical Decision Making):   Ddx: kidney stone, urinary retention/obstruction, pyelonephritis, UTI, Gigi, constipation. Plan: labs, UA, CT abd/pelvis without contrast. Will medicate with toradol and IVF while awaiting workup.  Will bladder scan to evaluate for retention. ED Course as of 11/04/22 2216 Fri Nov 04, 2022 2103 Patient with only 57 mL of urine in bladder, not consistent with retention. [JM]   2216 Patient signed out to Dr. Tonya Charles to follow up on CT abd/pelvis. [JM]      ED Course User Index  [JM] Luis F Schmitz MD       ED Course:   Initial assessment performed. The patient's presenting problems have been discussed, and they are in agreement with the care plan formulated and outlined with them. I have encouraged them to ask questions as they arise throughout their visit. Esteban Hoover MD      Diagnosis       Attestations:    Esteban Hoovre MD    Please note that this dictation was completed with [employer], the computer voice recognition software. Quite often unanticipated grammatical, syntax, homophones, and other interpretive errors are inadvertently transcribed by the computer software. Please disregard these errors. Please excuse any errors that have escaped final proofreading. Thank you.

## 2022-11-05 NOTE — ED TRIAGE NOTES
Patient states that he has not been able to urinate for approx 2 hours. Feels like his bladder is full, but unable to urinate. States he saw a drop of either blood or dark urine. Has intermittent flank pain for the past couple days.